# Patient Record
Sex: MALE | Race: OTHER | Employment: UNEMPLOYED | URBAN - METROPOLITAN AREA
[De-identification: names, ages, dates, MRNs, and addresses within clinical notes are randomized per-mention and may not be internally consistent; named-entity substitution may affect disease eponyms.]

---

## 2021-08-04 ENCOUNTER — APPOINTMENT (OUTPATIENT)
Dept: CT IMAGING | Age: 56
End: 2021-08-04
Payer: COMMERCIAL

## 2021-08-04 ENCOUNTER — HOSPITAL ENCOUNTER (EMERGENCY)
Age: 56
Discharge: HOME | End: 2021-08-04
Payer: COMMERCIAL

## 2021-08-04 VITALS
HEART RATE: 70 BPM | DIASTOLIC BLOOD PRESSURE: 87 MMHG | WEIGHT: 140 LBS | OXYGEN SATURATION: 96 % | RESPIRATION RATE: 18 BRPM | TEMPERATURE: 98 F | SYSTOLIC BLOOD PRESSURE: 129 MMHG
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
## 2021-08-04 PROCEDURE — 99284 EMERGENCY DEPT VISIT MOD MDM: CPT

## 2021-08-04 PROCEDURE — 71250 CT THORAX DX C-: CPT

## 2021-08-04 PROCEDURE — 70450 CT HEAD/BRAIN W/O DYE: CPT

## 2021-08-04 PROCEDURE — 6370000000 HC RX 637 (ALT 250 FOR IP): Performed by: PHYSICIAN ASSISTANT

## 2021-08-04 PROCEDURE — 72125 CT NECK SPINE W/O DYE: CPT

## 2021-08-04 RX ORDER — CYCLOBENZAPRINE HCL 10 MG
10 TABLET ORAL 3 TIMES DAILY PRN
Qty: 21 TABLET | Refills: 0 | Status: SHIPPED | OUTPATIENT
Start: 2021-08-04 | End: 2021-08-14

## 2021-08-04 RX ORDER — ACETAMINOPHEN 500 MG
1000 TABLET ORAL ONCE
Status: COMPLETED | OUTPATIENT
Start: 2021-08-04 | End: 2021-08-04

## 2021-08-04 RX ORDER — IBUPROFEN 600 MG/1
600 TABLET ORAL EVERY 6 HOURS PRN
Qty: 30 TABLET | Refills: 0 | Status: ON HOLD | OUTPATIENT
Start: 2021-08-04 | End: 2022-07-29 | Stop reason: HOSPADM

## 2021-08-04 RX ORDER — IBUPROFEN 600 MG/1
600 TABLET ORAL EVERY 6 HOURS PRN
Qty: 30 TABLET | Refills: 0 | Status: SHIPPED | OUTPATIENT
Start: 2021-08-04 | End: 2021-08-04 | Stop reason: SDUPTHER

## 2021-08-04 RX ORDER — CYCLOBENZAPRINE HCL 10 MG
10 TABLET ORAL 3 TIMES DAILY PRN
Qty: 21 TABLET | Refills: 0 | Status: SHIPPED | OUTPATIENT
Start: 2021-08-04 | End: 2021-08-04 | Stop reason: SDUPTHER

## 2021-08-04 RX ADMIN — ACETAMINOPHEN 1000 MG: 500 TABLET ORAL at 17:01

## 2021-08-04 ASSESSMENT — ENCOUNTER SYMPTOMS
SHORTNESS OF BREATH: 0
COUGH: 0
VOMITING: 0
NAUSEA: 0
ABDOMINAL PAIN: 0
DIARRHEA: 0
RHINORRHEA: 0

## 2021-08-04 ASSESSMENT — PAIN SCALES - GENERAL
PAINLEVEL_OUTOF10: 9
PAINLEVEL_OUTOF10: 9
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
## 2022-07-26 ENCOUNTER — APPOINTMENT (OUTPATIENT)
Dept: CT IMAGING | Age: 57
DRG: 282 | End: 2022-07-26
Payer: COMMERCIAL

## 2022-07-26 ENCOUNTER — APPOINTMENT (OUTPATIENT)
Dept: GENERAL RADIOLOGY | Age: 57
DRG: 282 | End: 2022-07-26
Payer: COMMERCIAL

## 2022-07-26 ENCOUNTER — HOSPITAL ENCOUNTER (INPATIENT)
Age: 57
LOS: 2 days | Discharge: HOME | DRG: 282 | End: 2022-07-29
Attending: HOSPITALIST | Admitting: HOSPITALIST
Payer: COMMERCIAL

## 2022-07-26 DIAGNOSIS — K85.90 ACUTE PANCREATITIS, UNSPECIFIED COMPLICATION STATUS, UNSPECIFIED PANCREATITIS TYPE: Primary | ICD-10-CM

## 2022-07-26 LAB
A/G RATIO: 1.5 (ref 1.1–2.2)
ALBUMIN SERPL-MCNC: 4.4 G/DL (ref 3.4–5)
ALP BLD-CCNC: 117 U/L (ref 40–129)
ALT SERPL-CCNC: 71 U/L (ref 10–40)
ANION GAP SERPL CALCULATED.3IONS-SCNC: 13 MMOL/L (ref 3–16)
AST SERPL-CCNC: 64 U/L (ref 15–37)
BACTERIA: NORMAL /HPF
BASOPHILS ABSOLUTE: 0 K/UL (ref 0–0.2)
BASOPHILS RELATIVE PERCENT: 0.5 %
BILIRUB SERPL-MCNC: 0.8 MG/DL (ref 0–1)
BILIRUBIN URINE: NEGATIVE
BLOOD, URINE: NEGATIVE
BUN BLDV-MCNC: 9 MG/DL (ref 7–20)
CALCIUM SERPL-MCNC: 9.4 MG/DL (ref 8.3–10.6)
CHLORIDE BLD-SCNC: 97 MMOL/L (ref 99–110)
CLARITY: CLEAR
CO2: 24 MMOL/L (ref 21–32)
COLOR: YELLOW
CREAT SERPL-MCNC: 0.7 MG/DL (ref 0.9–1.3)
EOSINOPHILS ABSOLUTE: 0.1 K/UL (ref 0–0.6)
EOSINOPHILS RELATIVE PERCENT: 0.6 %
EPITHELIAL CELLS, UA: 0 /HPF (ref 0–5)
GFR AFRICAN AMERICAN: >60
GFR NON-AFRICAN AMERICAN: >60
GLUCOSE BLD-MCNC: 180 MG/DL (ref 70–99)
GLUCOSE URINE: NEGATIVE MG/DL
HCT VFR BLD CALC: 42.4 % (ref 40.5–52.5)
HEMOGLOBIN: 15.1 G/DL (ref 13.5–17.5)
HYALINE CASTS: 0 /LPF (ref 0–8)
KETONES, URINE: NEGATIVE MG/DL
LEUKOCYTE ESTERASE, URINE: ABNORMAL
LIPASE: 158 U/L (ref 13–60)
LYMPHOCYTES ABSOLUTE: 1.5 K/UL (ref 1–5.1)
LYMPHOCYTES RELATIVE PERCENT: 17.6 %
MCH RBC QN AUTO: 33 PG (ref 26–34)
MCHC RBC AUTO-ENTMCNC: 35.5 G/DL (ref 31–36)
MCV RBC AUTO: 93.1 FL (ref 80–100)
MICROSCOPIC EXAMINATION: YES
MONOCYTES ABSOLUTE: 0.5 K/UL (ref 0–1.3)
MONOCYTES RELATIVE PERCENT: 6.5 %
NEUTROPHILS ABSOLUTE: 6.3 K/UL (ref 1.7–7.7)
NEUTROPHILS RELATIVE PERCENT: 74.8 %
NITRITE, URINE: NEGATIVE
PDW BLD-RTO: 13 % (ref 12.4–15.4)
PH UA: 5.5 (ref 5–8)
PLATELET # BLD: 119 K/UL (ref 135–450)
PMV BLD AUTO: 10 FL (ref 5–10.5)
POTASSIUM SERPL-SCNC: 4.8 MMOL/L (ref 3.5–5.1)
PROTEIN UA: 30 MG/DL
RBC # BLD: 4.56 M/UL (ref 4.2–5.9)
RBC UA: 3 /HPF (ref 0–4)
SODIUM BLD-SCNC: 134 MMOL/L (ref 136–145)
SPECIFIC GRAVITY UA: 1.02 (ref 1–1.03)
TOTAL PROTEIN: 7.3 G/DL (ref 6.4–8.2)
TROPONIN: <0.01 NG/ML
URINE REFLEX TO CULTURE: ABNORMAL
URINE TYPE: ABNORMAL
UROBILINOGEN, URINE: 0.2 E.U./DL
WBC # BLD: 8.4 K/UL (ref 4–11)
WBC UA: 3 /HPF (ref 0–5)

## 2022-07-26 PROCEDURE — 93005 ELECTROCARDIOGRAM TRACING: CPT | Performed by: PHYSICIAN ASSISTANT

## 2022-07-26 PROCEDURE — 81001 URINALYSIS AUTO W/SCOPE: CPT

## 2022-07-26 PROCEDURE — 71046 X-RAY EXAM CHEST 2 VIEWS: CPT

## 2022-07-26 PROCEDURE — 74177 CT ABD & PELVIS W/CONTRAST: CPT

## 2022-07-26 PROCEDURE — 96374 THER/PROPH/DIAG INJ IV PUSH: CPT

## 2022-07-26 PROCEDURE — 80053 COMPREHEN METABOLIC PANEL: CPT

## 2022-07-26 PROCEDURE — 6370000000 HC RX 637 (ALT 250 FOR IP): Performed by: PHYSICIAN ASSISTANT

## 2022-07-26 PROCEDURE — 6360000004 HC RX CONTRAST MEDICATION: Performed by: PHYSICIAN ASSISTANT

## 2022-07-26 PROCEDURE — 99285 EMERGENCY DEPT VISIT HI MDM: CPT

## 2022-07-26 PROCEDURE — 96375 TX/PRO/DX INJ NEW DRUG ADDON: CPT

## 2022-07-26 PROCEDURE — 85025 COMPLETE CBC W/AUTO DIFF WBC: CPT

## 2022-07-26 PROCEDURE — 83690 ASSAY OF LIPASE: CPT

## 2022-07-26 PROCEDURE — 84484 ASSAY OF TROPONIN QUANT: CPT

## 2022-07-26 RX ORDER — SODIUM CHLORIDE, SODIUM LACTATE, POTASSIUM CHLORIDE, CALCIUM CHLORIDE 600; 310; 30; 20 MG/100ML; MG/100ML; MG/100ML; MG/100ML
1000 INJECTION, SOLUTION INTRAVENOUS ONCE
Status: COMPLETED | OUTPATIENT
Start: 2022-07-27 | End: 2022-07-27

## 2022-07-26 RX ORDER — ONDANSETRON 2 MG/ML
4 INJECTION INTRAMUSCULAR; INTRAVENOUS EVERY 6 HOURS PRN
Status: DISCONTINUED | OUTPATIENT
Start: 2022-07-26 | End: 2022-07-27 | Stop reason: SDUPTHER

## 2022-07-26 RX ORDER — FAMOTIDINE 20 MG/1
20 TABLET, FILM COATED ORAL ONCE
Status: COMPLETED | OUTPATIENT
Start: 2022-07-26 | End: 2022-07-26

## 2022-07-26 RX ORDER — MORPHINE SULFATE 4 MG/ML
4 INJECTION, SOLUTION INTRAMUSCULAR; INTRAVENOUS EVERY 30 MIN PRN
Status: DISCONTINUED | OUTPATIENT
Start: 2022-07-26 | End: 2022-07-27 | Stop reason: HOSPADM

## 2022-07-26 RX ORDER — HYDROCODONE BITARTRATE AND ACETAMINOPHEN 5; 325 MG/1; MG/1
2 TABLET ORAL ONCE
Status: COMPLETED | OUTPATIENT
Start: 2022-07-26 | End: 2022-07-26

## 2022-07-26 RX ORDER — ONDANSETRON 4 MG/1
4 TABLET, ORALLY DISINTEGRATING ORAL ONCE
Status: COMPLETED | OUTPATIENT
Start: 2022-07-26 | End: 2022-07-26

## 2022-07-26 RX ADMIN — FAMOTIDINE 20 MG: 20 TABLET, FILM COATED ORAL at 20:18

## 2022-07-26 RX ADMIN — IOPAMIDOL 75 ML: 755 INJECTION, SOLUTION INTRAVENOUS at 21:30

## 2022-07-26 RX ADMIN — ALUMINUM HYDROXIDE, MAGNESIUM HYDROXIDE, AND SIMETHICONE: 200; 200; 20 SUSPENSION ORAL at 20:18

## 2022-07-26 RX ADMIN — HYDROCODONE BITARTRATE AND ACETAMINOPHEN 2 TABLET: 5; 325 TABLET ORAL at 20:18

## 2022-07-26 RX ADMIN — ONDANSETRON 4 MG: 4 TABLET, ORALLY DISINTEGRATING ORAL at 20:18

## 2022-07-26 ASSESSMENT — ENCOUNTER SYMPTOMS
SHORTNESS OF BREATH: 0
RHINORRHEA: 0
COUGH: 0
DIARRHEA: 0
NAUSEA: 1
ABDOMINAL PAIN: 0
ABDOMINAL DISTENTION: 1
VOMITING: 1

## 2022-07-26 ASSESSMENT — PAIN SCALES - GENERAL: PAINLEVEL_OUTOF10: 9

## 2022-07-26 ASSESSMENT — PAIN - FUNCTIONAL ASSESSMENT: PAIN_FUNCTIONAL_ASSESSMENT: 0-10

## 2022-07-26 ASSESSMENT — PAIN DESCRIPTION - LOCATION: LOCATION: CHEST

## 2022-07-27 ENCOUNTER — APPOINTMENT (OUTPATIENT)
Dept: ULTRASOUND IMAGING | Age: 57
DRG: 282 | End: 2022-07-27
Payer: COMMERCIAL

## 2022-07-27 PROBLEM — K85.00 IDIOPATHIC ACUTE PANCREATITIS: Status: ACTIVE | Noted: 2022-07-27

## 2022-07-27 PROBLEM — K85.90 ACUTE PANCREATITIS: Status: ACTIVE | Noted: 2022-07-27

## 2022-07-27 LAB
ESTIMATED AVERAGE GLUCOSE: 185.8 MG/DL
GLUCOSE BLD-MCNC: 126 MG/DL (ref 70–99)
GLUCOSE BLD-MCNC: 129 MG/DL (ref 70–99)
GLUCOSE BLD-MCNC: 141 MG/DL (ref 70–99)
GLUCOSE BLD-MCNC: 157 MG/DL (ref 70–99)
GLUCOSE BLD-MCNC: 165 MG/DL (ref 70–99)
GLUCOSE BLD-MCNC: 166 MG/DL (ref 70–99)
HBA1C MFR BLD: 8.1 %
PERFORMED ON: ABNORMAL
TRIGL SERPL-MCNC: 2026 MG/DL (ref 0–150)

## 2022-07-27 PROCEDURE — 84478 ASSAY OF TRIGLYCERIDES: CPT

## 2022-07-27 PROCEDURE — 6360000002 HC RX W HCPCS: Performed by: PHYSICIAN ASSISTANT

## 2022-07-27 PROCEDURE — 2580000003 HC RX 258: Performed by: PHYSICIAN ASSISTANT

## 2022-07-27 PROCEDURE — 76705 ECHO EXAM OF ABDOMEN: CPT

## 2022-07-27 PROCEDURE — 2580000003 HC RX 258: Performed by: HOSPITALIST

## 2022-07-27 PROCEDURE — 83036 HEMOGLOBIN GLYCOSYLATED A1C: CPT

## 2022-07-27 PROCEDURE — 1200000000 HC SEMI PRIVATE

## 2022-07-27 PROCEDURE — 6370000000 HC RX 637 (ALT 250 FOR IP): Performed by: INTERNAL MEDICINE

## 2022-07-27 PROCEDURE — 6360000002 HC RX W HCPCS: Performed by: HOSPITALIST

## 2022-07-27 PROCEDURE — 36415 COLL VENOUS BLD VENIPUNCTURE: CPT

## 2022-07-27 RX ORDER — SODIUM CHLORIDE 9 MG/ML
INJECTION, SOLUTION INTRAVENOUS PRN
Status: DISCONTINUED | OUTPATIENT
Start: 2022-07-27 | End: 2022-07-29 | Stop reason: HOSPADM

## 2022-07-27 RX ORDER — MAGNESIUM SULFATE IN WATER 40 MG/ML
2000 INJECTION, SOLUTION INTRAVENOUS PRN
Status: DISCONTINUED | OUTPATIENT
Start: 2022-07-27 | End: 2022-07-29 | Stop reason: HOSPADM

## 2022-07-27 RX ORDER — ONDANSETRON 2 MG/ML
4 INJECTION INTRAMUSCULAR; INTRAVENOUS EVERY 6 HOURS PRN
Status: DISCONTINUED | OUTPATIENT
Start: 2022-07-27 | End: 2022-07-29 | Stop reason: HOSPADM

## 2022-07-27 RX ORDER — MORPHINE SULFATE 4 MG/ML
4 INJECTION, SOLUTION INTRAMUSCULAR; INTRAVENOUS
Status: DISCONTINUED | OUTPATIENT
Start: 2022-07-27 | End: 2022-07-29 | Stop reason: HOSPADM

## 2022-07-27 RX ORDER — POTASSIUM CHLORIDE 7.45 MG/ML
10 INJECTION INTRAVENOUS PRN
Status: DISCONTINUED | OUTPATIENT
Start: 2022-07-27 | End: 2022-07-29 | Stop reason: HOSPADM

## 2022-07-27 RX ORDER — MORPHINE SULFATE 2 MG/ML
2 INJECTION, SOLUTION INTRAMUSCULAR; INTRAVENOUS
Status: DISCONTINUED | OUTPATIENT
Start: 2022-07-27 | End: 2022-07-29 | Stop reason: HOSPADM

## 2022-07-27 RX ORDER — ENOXAPARIN SODIUM 100 MG/ML
40 INJECTION SUBCUTANEOUS NIGHTLY
Status: DISCONTINUED | OUTPATIENT
Start: 2022-07-27 | End: 2022-07-29 | Stop reason: HOSPADM

## 2022-07-27 RX ORDER — SODIUM CHLORIDE 0.9 % (FLUSH) 0.9 %
5-40 SYRINGE (ML) INJECTION PRN
Status: DISCONTINUED | OUTPATIENT
Start: 2022-07-27 | End: 2022-07-29 | Stop reason: HOSPADM

## 2022-07-27 RX ORDER — SODIUM CHLORIDE 0.9 % (FLUSH) 0.9 %
5-40 SYRINGE (ML) INJECTION EVERY 12 HOURS SCHEDULED
Status: DISCONTINUED | OUTPATIENT
Start: 2022-07-27 | End: 2022-07-29 | Stop reason: HOSPADM

## 2022-07-27 RX ORDER — SODIUM CHLORIDE, SODIUM LACTATE, POTASSIUM CHLORIDE, CALCIUM CHLORIDE 600; 310; 30; 20 MG/100ML; MG/100ML; MG/100ML; MG/100ML
INJECTION, SOLUTION INTRAVENOUS CONTINUOUS
Status: DISCONTINUED | OUTPATIENT
Start: 2022-07-27 | End: 2022-07-27

## 2022-07-27 RX ORDER — INSULIN LISPRO 100 [IU]/ML
0-4 INJECTION, SOLUTION INTRAVENOUS; SUBCUTANEOUS EVERY 4 HOURS
Status: DISCONTINUED | OUTPATIENT
Start: 2022-07-27 | End: 2022-07-29 | Stop reason: HOSPADM

## 2022-07-27 RX ORDER — POTASSIUM CHLORIDE 20 MEQ/1
40 TABLET, EXTENDED RELEASE ORAL PRN
Status: DISCONTINUED | OUTPATIENT
Start: 2022-07-27 | End: 2022-07-29 | Stop reason: HOSPADM

## 2022-07-27 RX ORDER — DEXTROSE MONOHYDRATE 100 MG/ML
INJECTION, SOLUTION INTRAVENOUS CONTINUOUS PRN
Status: DISCONTINUED | OUTPATIENT
Start: 2022-07-27 | End: 2022-07-29 | Stop reason: HOSPADM

## 2022-07-27 RX ORDER — ACETAMINOPHEN 325 MG/1
650 TABLET ORAL EVERY 6 HOURS PRN
Status: DISCONTINUED | OUTPATIENT
Start: 2022-07-27 | End: 2022-07-29 | Stop reason: HOSPADM

## 2022-07-27 RX ORDER — ONDANSETRON 4 MG/1
4 TABLET, ORALLY DISINTEGRATING ORAL EVERY 8 HOURS PRN
Status: DISCONTINUED | OUTPATIENT
Start: 2022-07-27 | End: 2022-07-29 | Stop reason: HOSPADM

## 2022-07-27 RX ORDER — SODIUM CHLORIDE, SODIUM LACTATE, POTASSIUM CHLORIDE, CALCIUM CHLORIDE 600; 310; 30; 20 MG/100ML; MG/100ML; MG/100ML; MG/100ML
INJECTION, SOLUTION INTRAVENOUS CONTINUOUS
Status: DISCONTINUED | OUTPATIENT
Start: 2022-07-27 | End: 2022-07-29

## 2022-07-27 RX ORDER — KETOROLAC TROMETHAMINE 30 MG/ML
30 INJECTION, SOLUTION INTRAMUSCULAR; INTRAVENOUS EVERY 6 HOURS PRN
Status: DISCONTINUED | OUTPATIENT
Start: 2022-07-27 | End: 2022-07-29 | Stop reason: HOSPADM

## 2022-07-27 RX ADMIN — KETOROLAC TROMETHAMINE 30 MG: 30 INJECTION, SOLUTION INTRAMUSCULAR at 21:30

## 2022-07-27 RX ADMIN — ONDANSETRON 4 MG: 2 INJECTION INTRAMUSCULAR; INTRAVENOUS at 00:03

## 2022-07-27 RX ADMIN — SODIUM CHLORIDE, POTASSIUM CHLORIDE, SODIUM LACTATE AND CALCIUM CHLORIDE: 600; 310; 30; 20 INJECTION, SOLUTION INTRAVENOUS at 13:01

## 2022-07-27 RX ADMIN — SODIUM CHLORIDE, POTASSIUM CHLORIDE, SODIUM LACTATE AND CALCIUM CHLORIDE 1000 ML: 600; 310; 30; 20 INJECTION, SOLUTION INTRAVENOUS at 00:02

## 2022-07-27 RX ADMIN — MORPHINE SULFATE 2 MG: 2 INJECTION, SOLUTION INTRAMUSCULAR; INTRAVENOUS at 10:20

## 2022-07-27 RX ADMIN — MORPHINE SULFATE 4 MG: 4 INJECTION, SOLUTION INTRAMUSCULAR; INTRAVENOUS at 00:03

## 2022-07-27 RX ADMIN — SODIUM CHLORIDE, POTASSIUM CHLORIDE, SODIUM LACTATE AND CALCIUM CHLORIDE: 600; 310; 30; 20 INJECTION, SOLUTION INTRAVENOUS at 01:52

## 2022-07-27 RX ADMIN — ACETAMINOPHEN 650 MG: 325 TABLET ORAL at 12:58

## 2022-07-27 RX ADMIN — SODIUM CHLORIDE, POTASSIUM CHLORIDE, SODIUM LACTATE AND CALCIUM CHLORIDE: 600; 310; 30; 20 INJECTION, SOLUTION INTRAVENOUS at 15:34

## 2022-07-27 RX ADMIN — MORPHINE SULFATE 4 MG: 4 INJECTION, SOLUTION INTRAMUSCULAR; INTRAVENOUS at 01:58

## 2022-07-27 RX ADMIN — SODIUM CHLORIDE, POTASSIUM CHLORIDE, SODIUM LACTATE AND CALCIUM CHLORIDE: 600; 310; 30; 20 INJECTION, SOLUTION INTRAVENOUS at 10:20

## 2022-07-27 RX ADMIN — ENOXAPARIN SODIUM 40 MG: 100 INJECTION SUBCUTANEOUS at 21:30

## 2022-07-27 ASSESSMENT — PAIN DESCRIPTION - DESCRIPTORS
DESCRIPTORS: ACHING

## 2022-07-27 ASSESSMENT — PAIN DESCRIPTION - LOCATION
LOCATION: ABDOMEN
LOCATION: ABDOMEN
LOCATION: HEAD

## 2022-07-27 ASSESSMENT — PAIN DESCRIPTION - ORIENTATION
ORIENTATION: MID
ORIENTATION: RIGHT
ORIENTATION: POSTERIOR
ORIENTATION: MID

## 2022-07-27 ASSESSMENT — PAIN SCALES - GENERAL
PAINLEVEL_OUTOF10: 9
PAINLEVEL_OUTOF10: 7
PAINLEVEL_OUTOF10: 9
PAINLEVEL_OUTOF10: 7

## 2022-07-27 ASSESSMENT — PAIN - FUNCTIONAL ASSESSMENT
PAIN_FUNCTIONAL_ASSESSMENT: ACTIVITIES ARE NOT PREVENTED
PAIN_FUNCTIONAL_ASSESSMENT: ACTIVITIES ARE NOT PREVENTED

## 2022-07-27 ASSESSMENT — PAIN DESCRIPTION - PAIN TYPE: TYPE: ACUTE PAIN

## 2022-07-27 NOTE — ED NOTES
Report given to [facility], RN, patient left this ER in stable condition at this time.       Juan Perera RN  07/27/22 0127

## 2022-07-27 NOTE — PROGRESS NOTES
Patient admitted after midnight with epigastric pain. No new medications. Smokes 3 to 4 cigarettes/day. Moderate alcohol use. No nausea or vomiting at present time. He complains of a headache. Will order right upper quadrant ultrasound. Triglycerides are pending. Continue morphine. And IV fluids add Toradol.     Sharon Joseph PA-c

## 2022-07-27 NOTE — PLAN OF CARE
Problem: Discharge Planning  Goal: Discharge to home or other facility with appropriate resources  7/27/2022 1143 by Eran Rhodes RN  Outcome: Progressing Towards Goal  Flowsheets (Taken 7/27/2022 0531 by Nesha Orr RN)  Discharge to home or other facility with appropriate resources:   Identify barriers to discharge with patient and caregiver   Refer to discharge planning if patient needs post-hospital services based on physician order or complex needs related to functional status, cognitive ability or social support system  7/27/2022 0528 by Nesha Orr RN  Outcome: Progressing Towards Goal  Flowsheets (Taken 7/27/2022 0205)  Discharge to home or other facility with appropriate resources: Identify barriers to discharge with patient and caregiver     Problem: Pain  Goal: Verbalizes/displays adequate comfort level or baseline comfort level  7/27/2022 1143 by Eran Rhodes RN  Outcome: Progressing Towards Goal  7/27/2022 0528 by Nesha Orr RN  Outcome: Progressing Towards Goal     Problem: Gastrointestinal - Adult  Goal: Minimal or absence of nausea and vomiting  7/27/2022 1143 by Eran Rhodes RN  Outcome: Progressing Towards Goal  7/27/2022 0528 by Nesha Orr RN  Outcome: Progressing Towards Goal  Flowsheets (Taken 7/27/2022 0205)  Minimal or absence of nausea and vomiting:   Administer IV fluids as ordered to ensure adequate hydration   Maintain NPO status until nausea and vomiting are resolved   Provide nonpharmacologic comfort measures as appropriate  Goal: Maintains or returns to baseline bowel function  7/27/2022 1143 by Eran Rhodes RN  Outcome: Progressing Towards Goal  7/27/2022 0528 by Nesha Orr RN  Outcome: Progressing Towards Goal  Flowsheets (Taken 7/27/2022 0205)  Maintains or returns to baseline bowel function:   Assess bowel function   Administer IV fluids as ordered to ensure adequate hydration  Goal: Maintains adequate nutritional intake  7/27/2022 1143 by Eran Rhodes RN  Outcome: Progressing Towards Goal  7/27/2022 0528 by Chas Muhammad RN  Outcome: Progressing Towards Goal  Flowsheets (Taken 7/27/2022 0205)  Maintains adequate nutritional intake: Monitor percentage of each meal consumed     Problem: Metabolic/Fluid and Electrolytes - Adult  Goal: Electrolytes maintained within normal limits  7/27/2022 1143 by Irasema Garay RN  Outcome: Progressing Towards Goal  7/27/2022 0528 by Chas Muhammad RN  Outcome: Progressing Towards Goal  Flowsheets (Taken 7/27/2022 0205)  Electrolytes maintained within normal limits: Monitor labs and assess patient for signs and symptoms of electrolyte imbalances

## 2022-07-27 NOTE — PROGRESS NOTES
Pt assessment completed, VSS, A&Ox4, make needs known, C/O pain 9/10 to abd and headache, med administered per order, remains NPO at this time, POC and education reviewed with pt and mutually agreed upon, all needs met at this time, will continue to monitor.

## 2022-07-27 NOTE — PLAN OF CARE
Problem: Discharge Planning  Goal: Discharge to home or other facility with appropriate resources  Outcome: Progressing Towards Goal     Problem: Pain  Goal: Verbalizes/displays adequate comfort level or baseline comfort level  Outcome: Progressing Towards Goal     Problem: Gastrointestinal - Adult  Goal: Minimal or absence of nausea and vomiting  Outcome: Progressing Towards Goal  Goal: Maintains or returns to baseline bowel function  Outcome: Progressing Towards Goal  Goal: Maintains adequate nutritional intake  Outcome: Progressing Towards Goal     Problem: Metabolic/Fluid and Electrolytes - Adult  Goal: Electrolytes maintained within normal limits  Outcome: Progressing Towards Goal

## 2022-07-27 NOTE — ED PROVIDER NOTES
[facility]        Pt Name: Dhiraj Rivera  MRN: [id number]  [place] 1965  Date of evaluation: 7/26/2022  Provider: Vinayak Watkins PA-C  PCP: No primary care provider on file. Note Started: 8:27 PM EDT       THOMAS. I have evaluated this patient. My supervising physician was available for consultation. CHIEF COMPLAINT       Chief Complaint   Patient presents with    Chest Pain     Chest pain, rib pain, n/v       HISTORY OF PRESENT ILLNESS   (Location, Timing/Onset, Context/Setting, Quality, Duration, Modifying Factors, Severity, Associated Signs and Symptoms)  Note limiting factors. The patient's preferred language is Armenian, language lines are used for interpretation    Chief Complaint: Abdominal pain    Harpreet Brower is a 56 y.o. male who presents to the emergency department today for evaluation for abdominal pain. The patient states that he started with a \"burning\" epigastric abdominal pain and right upper quadrant abdominal pain since 11 PM last night. The patient states he attempted to drink warm water without much improvement. He states that he has had this pain in the past and thought that this was due to reflux. Patient states that his pain continued throughout the night, and into today. He is nauseous and did have 1 episode of emesis. There is no bilious emesis, hematemesis or coffee-ground emesis. Patient states that he has pain to his epigastric abdomen and right upper quadrant only, denies any pain radiating up into the chest.  He has no shortness of breath. He has not had any fever chills per no cough or congestion. No diarrhea. He has no dysuria or hematuria. He has a history of diabetes and coronary artery disease. No past surgical history to the abdomen.   Patient otherwise has no other complaints at this time    Nursing Notes were all reviewed and agreed with or any disagreements were addressed in the HPI. REVIEW OF SYSTEMS    (2-9 systems for level 4, 10 or more for level 5)     Review of Systems   Constitutional:  Negative for activity change, appetite change, chills and fever. HENT:  Negative for congestion and rhinorrhea. Respiratory:  Negative for cough and shortness of breath. Cardiovascular:  Negative for chest pain. Gastrointestinal:  Positive for abdominal distention, nausea and vomiting. Negative for abdominal pain and diarrhea. Genitourinary:  Negative for difficulty urinating, dysuria and hematuria. Positives and Pertinent negatives as per HPI. Except as noted above in the ROS, all other systems were reviewed and negative. PAST MEDICAL HISTORY     Past Medical History:   Diagnosis Date    CAD (coronary artery disease)     Diabetes mellitus ([facility] [address]          SURGICAL HISTORY   History reviewed. No pertinent surgical history. CURRENTMEDICATIONS       Previous Medications    IBUPROFEN (ADVIL;MOTRIN) 600 MG TABLET    Take 1 tablet by mouth every 6 hours as needed for Pain         ALLERGIES     Patient has no known allergies. FAMILYHISTORY     History reviewed. No pertinent family history. SOCIAL HISTORY       Social History     Tobacco Use    Smoking status: Never   Substance Use Topics    Alcohol use: Not Currently    Drug use: Not Currently       SCREENINGS             PHYSICAL EXAM    (up to 7 for level 4, 8 or more for level 5)     ED Triage Vitals   BP Temp Temp Source Heart Rate Resp SpO2 Height Weight   07/26/22 2004 07/26/22 2000 07/26/22 2000 07/26/22 2004 07/26/22 2004 07/26/22 2004 -- 07/26/22 2004   (!) 133/92 98.7 °F (37.1 °C) Oral 92 18 94 %  138 lb (62.6 kg)       Physical Exam  Vitals and nursing note reviewed. Constitutional:       Appearance: He is well-developed. He is not diaphoretic. HENT:      Head: Normocephalic and atraumatic.       Right Ear: External ear normal.      Left Ear: External ear normal.      Nose: Nose normal. Eyes:      General:         Right eye: No discharge. Left eye: No discharge. Neck:      Trachea: No tracheal deviation. Cardiovascular:      Rate and Rhythm: Normal rate and regular rhythm. Pulmonary:      Effort: Pulmonary effort is normal. No respiratory distress. Breath sounds: Normal breath sounds. No wheezing or rales. Abdominal:      General: Bowel sounds are normal. There is no distension. Palpations: Abdomen is soft. Tenderness: There is abdominal tenderness in the right upper quadrant and epigastric area. There is no guarding or rebound. Musculoskeletal:         General: Normal range of motion. Cervical back: Normal range of motion and neck supple. Skin:     General: Skin is warm and dry. Neurological:      Mental Status: He is alert and oriented to person, place, and time. Psychiatric:         Behavior: Behavior normal.       DIAGNOSTIC RESULTS   LABS:    Labs Reviewed   CBC WITH AUTO DIFFERENTIAL - Abnormal; Notable for the following components:       Result Value    Platelets 416 (*)     All other components within normal limits   COMPREHENSIVE METABOLIC PANEL - Abnormal; Notable for the following components:    Sodium 134 (*)     Chloride 97 (*)     Glucose 180 (*)     Creatinine 0.7 (*)     ALT 71 (*)     AST 64 (*)     All other components within normal limits   LIPASE - Abnormal; Notable for the following components:    Lipase 158.0 (*)     All other components within normal limits   URINALYSIS WITH REFLEX TO CULTURE - Abnormal; Notable for the following components:    Protein, UA 30 (*)     Leukocyte Esterase, Urine TRACE (*)     All other components within normal limits   TROPONIN   MICROSCOPIC URINALYSIS       When ordered only abnormal lab results are displayed. All other labs were within normal range or not returned as of this dictation. EKG:  When ordered, EKG's are interpreted by the Emergency Department Physician in the absence of a cardiologist.  Please see their note for interpretation of EKG. RADIOLOGY:   Non-plain film images such as CT, Ultrasound and MRI are read by the radiologist. Plain radiographic images are visualized and preliminarily interpreted by the ED Provider with the below findings:        Interpretation per the Radiologist below, if available at the time of this note:    CT ABDOMEN PELVIS W IV CONTRAST Additional Contrast? None   Final Result   1. Findings compatible with mild acute pancreatitis involving the head. No   identifiable gallstones or evidence for biliary dilatation. 2.  Marked hepatic steatosis. 3.  Left nephrolithiasis. XR CHEST (2 VW)   Final Result   No acute process. No results found.         PROCEDURES   Unless otherwise noted below, none     Procedures    CRITICAL CARE TIME       CONSULTS:  None      EMERGENCY DEPARTMENT COURSE and DIFFERENTIAL DIAGNOSIS/MDM:   Vitals:    Vitals:    07/26/22 2000 07/26/22 2004   BP:  (!) 133/92   Pulse:  92   Resp:  18   Temp: 98.7 °F (37.1 °C)    TempSrc: Oral    SpO2:  94%   Weight:  138 lb (62.6 kg)       Patient was given the following medications:  Medications   morphine injection 4 mg (4 mg IntraVENous Given 7/27/22 0003)   ondansetron (ZOFRAN) injection 4 mg (4 mg IntraVENous Given 7/27/22 0003)   aluminum & magnesium hydroxide-simethicone (MAALOX) 30 mL, lidocaine viscous hcl (XYLOCAINE) 5 mL (GI COCKTAIL) ( Oral Given 7/26/22 2018)   HYDROcodone-acetaminophen (NORCO) 5-325 MG per tablet 2 tablet (2 tablets Oral Given 7/26/22 2018)   ondansetron (ZOFRAN-ODT) disintegrating tablet 4 mg (4 mg Oral Given 7/26/22 2018)   famotidine (PEPCID) tablet 20 mg (20 mg Oral Given 7/26/22 2018)   iopamidol (ISOVUE-370) 76 % injection 75 mL (75 mLs IntraVENous Given 7/26/22 2130)   lactated ringers infusion 1,000 mL (1,000 mLs IntraVENous New Bag 7/27/22 0002)         Is this patient to be included in the SEP-1 Core Measure due to severe sepsis or septic shock? No   Exclusion criteria - the patient is NOT to be included for SEP-1 Core Measure due to:  2+ SIRS criteria are not met    Briefly, this is a 16-year-old male who presents to the emergency department today for evaluation for right upper quadrant epigastric abdominal pain. Symptoms began at 11 PM last night. Have been constant. Associated nausea and vomiting. Has no chest pain    Physical exam he does have tenderness to his epigastric abdomen and right upper quadrant. Although I feel that clinically symptoms are likely due to an abdominal etiology, due to his history of coronary artery disease and diabetes will obtain EKG, troponin    EKG seconded by my attending, please see his note for further details    CBC shows no evidence of leukocytosis or anemia. CMP does show elevated AST and ALT however this is due to specimen hemolyzed this. Lipase is elevated at 158. Urine shows no evidence of infection or blood, troponin is negative. CT obtained which shows acute pancreatitis there is no gallstones or biliary dilatation    Due to the findings of acute pancreatitis, clinical presentation I feel that he will need to be admitted for further care and evaluation, fluids, pain medications given, patient is updated, agreeable with plan, and stable for admission  FINAL IMPRESSION      1. Acute pancreatitis, unspecified complication status, unspecified pancreatitis type          DISPOSITION/PLAN   DISPOSITION Decision To Admit 07/26/2022 11:55:21 PM      PATIENT REFERRED TO:  No follow-up provider specified.     DISCHARGE MEDICATIONS:  New Prescriptions    No medications on file       DISCONTINUED MEDICATIONS:  Discontinued Medications    No medications on file              (Please note that portions of this note were completed with a voice recognition program.  Efforts were made to edit the dictations but occasionally words are mis-transcribed.)    Vinayak Watkins PA-C (electronically signed) Vlad Medel PA-C  07/27/22 0013

## 2022-07-27 NOTE — CONSULTS
Gastroenterology Consult Note        Patient: Tama Severs  : 1965  Acct#:      Date:  2022    Subjective:       History of Present Illness  Patient is a 56 y.o.  male admitted with Idiopathic acute pancreatitis [K85.00]  Acute pancreatitis, unspecified complication status, unspecified pancreatitis type [K85.90] who is seen in consult for pancreatitis. History obtained via Daphne . Pt had sudden onset of a burning pain in the RUQ with radiation to the back  with associated N/V which started 2 nights ago. Pain was persistent so came to the ED. Dx with acute pancreatitis. No h/o pancreatitis. Drinks alcohol occasionally described as a few glasses of alcohol once every 1-2 weeks. Less abdominal pain today. Past Medical History:   Diagnosis Date    CAD (coronary artery disease)     Diabetes mellitus ([facility] [address]       History reviewed. No pertinent surgical history. Past Endoscopic History    Admission Meds  No current facility-administered medications on file prior to encounter. Current Outpatient Medications on File Prior to Encounter   Medication Sig Dispense Refill    ibuprofen (ADVIL;MOTRIN) 600 MG tablet Take 1 tablet by mouth every 6 hours as needed for Pain (Patient not taking: Reported on 2022) 30 tablet 0       Omeprazole, metformin, rosuvastatin , and fish oil. No new meds. Allergies  No Known Allergies   Social   Social History     Tobacco Use    Smoking status: Never    Smokeless tobacco: Not on file   Substance Use Topics    Alcohol use: Not Currently        History reviewed. No pertinent family history.        Review of Systems  Constitutional: negative for fevers, chills, sweats    Ears, nose, mouth, throat, and face: negative for nasal congestion and sore throat   Respiratory: negative for cough and shortness of breath   Cardiovascular: negative for chest pain and dyspnea   Gastrointestinal: see hpi   Genitourinary:negative for dysuria and frequency   Integument/breast: negative for pruritus and rash   Hematologic/lymphatic: negative for bleeding and easy bruising   Musculoskeletal:negative for arthralgias and myalgias   Neurological: negative for dizziness and weakness       Physical Exam  Blood pressure 131/78, pulse 87, temperature 98 °F (36.7 °C), temperature source Oral, resp. rate 16, height 5' 7\" (1.702 m), weight 141 lb 6.4 oz (64.1 kg), SpO2 94 %. General appearance: alert, cooperative, no distress, appears stated age  Eyes: Anicteric  Head: Normocephalic, without obvious abnormality  Lungs: clear to auscultation bilaterally, Normal Effort  Heart: regular rate and rhythm, normal S1 and S2, no murmurs or rubs  Abdomen: soft, non-tender. Bowel sounds normal. No masses,  no organomegaly. Extremities: atraumatic, no cyanosis or edema  Skin: warm and dry, no jaundice  Neuro: Grossly intact, A&OX3  Musculoskeletal: 5/5  strength BUE      Data Review:    Recent Labs     07/26/22 2023   WBC 8.4   HGB 15.1   HCT 42.4   MCV 93.1   *     Recent Labs     07/26/22 2023   *   K 4.8   CL 97*   CO2 24   BUN 9   CREATININE 0.7*     Recent Labs     07/26/22 2023   AST 64*   ALT 71*   BILITOT 0.8   ALKPHOS 117     Recent Labs     07/26/22 2023   LIPASE 158.0*     No results for input(s): PROTIME, INR in the last 72 hours. No results for input(s): PTT in the last 72 hours. No results for input(s): OCCULTBLD in the last 72 hours. Imaging Studies:               CT-scan of abdomen and pelvis w iv contrast 7/26/22  Impression   1. Findings compatible with mild acute pancreatitis involving the head. No   identifiable gallstones or evidence for biliary dilatation. 2.  Marked hepatic steatosis. 3.  Left nephrolithiasis. Assessment:     Principal Problem:    Idiopathic acute pancreatitis  Active Problems:    Acute pancreatitis  Resolved Problems:    * No resolved hospital problems.  *    Acute pancreatitis - RUQ pain along with mild elevation of transaminses, lipase 158, and CT evidence of mild acute pancreatitis. Right upper quadrant ultrasound pending. Alcohol use is not often enough to cause pancreatitis. Triglyceride level pending. No new meds. Calcium normal.      Recommendations:   - NPO  - IVF with LR at 250 ml/hr  - I&O  - RUQ US  - f/u TG level    Discussed with Dr. Erik Ramey, PA-C  [facility]  I have personally performed a face to face diagnostic evaluation on this patient. I have interviewed and examined the patient and I agree with the findings and recommended plan of care. In summary, my findings and plan are the following: As above, 57 yo Daphne man with first episode of acute pancreatitis. Moderate alcohol and tobacco history could be cause, but need to r/o GB disease and metabolic causes. He appears comfortable and abd is soft and NT on my exam.  Agree with plan as outlined above. Further rec's based on US and blood results. Discussed with patient via .     Julianne Brewer, [address] [place] [address]  7/27/2022

## 2022-07-27 NOTE — H&P
_    [facility] HOSPITALIST HISTORY AND PHYSICAL/CONSULT    7/27/2022 12:17 AM    Patient Information:  Johnny Billingsley is a 56 y.o. male [id number]    PCP:  No primary care provider on file. (Tel: None )    Date of Service:   Pt seen/examined on 7/27/2022   Admitted to Inpatient with expected LOS greater than two midnights due to medical therapy. Chief complaint:    Chief Complaint   Patient presents with    Chest Pain     Chest pain, rib pain, n/v       History of Present Illness:  Tre Woodward is a 56 y.o. male who presented with   Comes from home w. Upper Abd pain and RUQ pain  since last 1 day or so , since 11 PM last night . Also he reports 2-3 vomiting episodes at home This AM . He has been doing poorly w/ PO intake AT home since his s/s started last night   He is Daphne speaking . History and pertinent information obtained from   ED provider   Prior Chart    Patient        Vitals:    07/26/22 2000 07/26/22 2004   BP:  (!) 133/92   Pulse:  92   Resp:  18   Temp: 98.7 °F (37.1 °C)    TempSrc: Oral    SpO2:  94%   Weight:  138 lb (62.6 kg)          Imaging/Labs/Work up/Medications given/Consults called by ED => Reviewed and Noted    GI Cocktail X 1   Pepcid X 1   Norco X 1   LR X 1   Zofran X 1       Current Facility-Administered Medications: morphine injection 4 mg, 4 mg, IntraVENous, Q30 Min PRN  ondansetron (ZOFRAN) injection 4 mg, 4 mg, IntraVENous, Q6H PRN      On my evaluation, patient's condition as below :   Since arrival to ED, post above Rx, patient is AO X 3   Pain is better since arrival now   No active N/V noted in ED     POC d/w Him       REVIEW OF SYSTEMS:     Pertinent positives and negatives are noted in the HPI . All other systems were reviewed and are negative. Past Medical History:         has a past medical history of CAD (coronary artery disease) and Diabetes mellitus ([facility] [address] Past Surgical History:         has no past surgical history on file. Medications:          No current facility-administered medications on file prior to encounter. Current Outpatient Medications on File Prior to Encounter   Medication Sig Dispense Refill    ibuprofen (ADVIL;MOTRIN) 600 MG tablet Take 1 tablet by mouth every 6 hours as needed for Pain (Patient not taking: Reported on 7/26/2022) 30 tablet 0         Allergies:  No Known Allergies       Social History:   reports that he does not currently use alcohol. He reports that he does not currently use drugs. Family History:        History reviewed. No pertinent family history. Physical Exam:  BP (!) 133/92   Pulse 92   Temp 98.7 °F (37.1 °C) (Oral)   Resp 18   Wt 138 lb (62.6 kg)   SpO2 94%     General appearance:  Appears comfortable. Eyes:  Sclera clear, pupils equal  ENT: Dry  mucus membranes, Trachea midline. Cardiovascular: Regular rhythm, normal S1, S2. No edema in lower extremities   Respiratory:  Noted Clear to auscultation bilaterally,  No wheeze, good inspiratory effort   Gastrointestinal:  Abdomen soft,  Epigastric -tender,  not distended, normal bowel sounds   Musculoskeletal: No cyanosis in digits, neck supple  Neurology:  Cranial nerves grossly intact. Alert and oriented in time, place and person. No speech or motor deficits   Psychiatry:  Appropriate affect. Not agitated  Skin:  Warm, dry, normal turgor, no rash       Labs:     Recent Labs     07/26/22 2023   WBC 8.4   HGB 15.1   HCT 42.4   *     Recent Labs     07/26/22 2023   *   K 4.8   CL 97*   CO2 24   BUN 9   CREATININE 0.7*   CALCIUM 9.4     Recent Labs     07/26/22 2023   AST 64*   ALT 71*   BILITOT 0.8   ALKPHOS 117     No results for input(s): INR in the last 72 hours.   Recent Labs     07/26/22 2023   TROPONINI <0.01         Urinalysis:      Lab Results   Component Value Date/Time    NITRU Negative 07/26/2022 08:20 PM    [address] 07/26/2022 08:20 PM    BACTERIA None Seen 07/26/2022 08:20 PM    RBCUA 3 07/26/2022 08:20 PM    BLOODU Negative 07/26/2022 08:20 PM    SPECGRAV 1.020 07/26/2022 08:20 PM    GLUCOSEU Negative 07/26/2022 08:20 PM         Radiology:       IMAGING :     CT ABDOMEN PELVIS W IV CONTRAST Additional Contrast? None   Final Result   1. Findings compatible with mild acute pancreatitis involving the head. No   identifiable gallstones or evidence for biliary dilatation. 2.  Marked hepatic steatosis. 3.  Left nephrolithiasis. XR CHEST (2 VW)   Final Result   No acute process. PROBLEM LIST [ ACTIVE + CHRONIC ]     Active Hospital Problems    Diagnosis Date Noted    Acute pancreatitis [K85.90] 07/27/2022     Priority: Medium    Idiopathic acute pancreatitis [K85.00] 07/27/2022     Priority: Medium         ACUTE/CHRONIC ISSUES     Acute idiopathic pancreatitis POA   Lipase elevation POA 2/2 above   Mild hyponatremia POA 2/2 Hypovolemia and poor PO Intake and N/V @ Home   Left nephrolithiasis POA   Fatty liver per imaging     Diet controlled DM Hx       PERTINENT PLAN OF CARE      Prn control. Supportive care. Aggressive IVF  NPO for now . Advance diet per GI recommendations   GI c/s in AM Planned   Insulin SQ for DM Mgt in house     Medication received in ED and workup in ED so far Noted and reviewed as above. Home medications for chronic medical problems Reviewed and Held/Resumed/Changes made, as clinically warranted/Indicated. Please See EPIC Order for detailed plans of care and further Details. DVT Prophylaxis . Is on Lovenox SQ   ; + SCDs   Nutrition/Diet. No diet orders on file  Code Status/Advanced Care Plan . No Order  PT/OT and ambulatory Eval Status. Ambulate As tolerated   Probable LOS & future Disposition planned post discharge .  Home in 2-3 days       CONSULTS ORDERED @ ADMISSION  IP CONSULT TO GI      Medical Decision Making : HIGH     Total patient Care [ Direct and Indirect ] time spent in evaluating the patient an discussing plan with appropriate staff/patient/family members is approximately ~ 48 min       Jo Ann Oseguera MD    Hospitalist, [facility].    7/27/2022 1:01 AM

## 2022-07-27 NOTE — ED PROVIDER NOTES
EKG is reviewed by myself. Dated today 2021. Rate 80 sinus rhythm normal EKG.      Marisela Castorena MD  07/26/22 2016

## 2022-07-27 NOTE — CARE COORDINATION
Discharge Planning Note:    Chart reviewed and it appears that patient has minimal needs for discharge at this time. Discussed with patients nurse and requested that case management be notified if discharge needs are identified.     - Current discharge plan is for the patient to return home with no needs. Case management will continue to follow progress and update discharge plan as needed.       Risk of Readmission Score: 4%    RADHA Sorensen RN    [facility]  Phone: [phone]

## 2022-07-28 LAB
A/G RATIO: 1.4 (ref 1.1–2.2)
ALBUMIN SERPL-MCNC: 3.6 G/DL (ref 3.4–5)
ALP BLD-CCNC: 96 U/L (ref 40–129)
ALT SERPL-CCNC: 46 U/L (ref 10–40)
ANION GAP SERPL CALCULATED.3IONS-SCNC: 8 MMOL/L (ref 3–16)
AST SERPL-CCNC: 56 U/L (ref 15–37)
BASOPHILS ABSOLUTE: 0 K/UL (ref 0–0.2)
BASOPHILS RELATIVE PERCENT: 0.4 %
BILIRUB SERPL-MCNC: 0.7 MG/DL (ref 0–1)
BUN BLDV-MCNC: 6 MG/DL (ref 7–20)
CALCIUM SERPL-MCNC: 9 MG/DL (ref 8.3–10.6)
CHLORIDE BLD-SCNC: 100 MMOL/L (ref 99–110)
CO2: 30 MMOL/L (ref 21–32)
CREAT SERPL-MCNC: <0.5 MG/DL (ref 0.9–1.3)
EKG ATRIAL RATE: 80 BPM
EKG DIAGNOSIS: NORMAL
EKG P AXIS: 44 DEGREES
EKG P-R INTERVAL: 160 MS
EKG Q-T INTERVAL: 370 MS
EKG QRS DURATION: 88 MS
EKG QTC CALCULATION (BAZETT): 426 MS
EKG R AXIS: 8 DEGREES
EKG T AXIS: 46 DEGREES
EKG VENTRICULAR RATE: 80 BPM
EOSINOPHILS ABSOLUTE: 0.1 K/UL (ref 0–0.6)
EOSINOPHILS RELATIVE PERCENT: 1.3 %
GFR AFRICAN AMERICAN: >60
GFR NON-AFRICAN AMERICAN: >60
GLUCOSE BLD-MCNC: 100 MG/DL (ref 70–99)
GLUCOSE BLD-MCNC: 101 MG/DL (ref 70–99)
GLUCOSE BLD-MCNC: 111 MG/DL (ref 70–99)
GLUCOSE BLD-MCNC: 135 MG/DL (ref 70–99)
GLUCOSE BLD-MCNC: 169 MG/DL (ref 70–99)
GLUCOSE BLD-MCNC: 97 MG/DL (ref 70–99)
HCT VFR BLD CALC: 36.2 % (ref 40.5–52.5)
HEMOGLOBIN: 12.3 G/DL (ref 13.5–17.5)
LIPASE: 88 U/L (ref 13–60)
LYMPHOCYTES ABSOLUTE: 1.4 K/UL (ref 1–5.1)
LYMPHOCYTES RELATIVE PERCENT: 23.1 %
MAGNESIUM: 1.9 MG/DL (ref 1.8–2.4)
MCH RBC QN AUTO: 31.7 PG (ref 26–34)
MCHC RBC AUTO-ENTMCNC: 34 G/DL (ref 31–36)
MCV RBC AUTO: 93.2 FL (ref 80–100)
MONOCYTES ABSOLUTE: 0.3 K/UL (ref 0–1.3)
MONOCYTES RELATIVE PERCENT: 5.8 %
NEUTROPHILS ABSOLUTE: 4.1 K/UL (ref 1.7–7.7)
NEUTROPHILS RELATIVE PERCENT: 69.4 %
PDW BLD-RTO: 12.9 % (ref 12.4–15.4)
PERFORMED ON: ABNORMAL
PERFORMED ON: NORMAL
PLATELET # BLD: 78 K/UL (ref 135–450)
PLATELET SLIDE REVIEW: ABNORMAL
PMV BLD AUTO: 10.4 FL (ref 5–10.5)
POTASSIUM REFLEX MAGNESIUM: 3.5 MMOL/L (ref 3.5–5.1)
RBC # BLD: 3.88 M/UL (ref 4.2–5.9)
SLIDE REVIEW: ABNORMAL
SODIUM BLD-SCNC: 138 MMOL/L (ref 136–145)
TOTAL PROTEIN: 6.1 G/DL (ref 6.4–8.2)
WBC # BLD: 5.9 K/UL (ref 4–11)

## 2022-07-28 PROCEDURE — 93010 ELECTROCARDIOGRAM REPORT: CPT | Performed by: INTERNAL MEDICINE

## 2022-07-28 PROCEDURE — 85025 COMPLETE CBC W/AUTO DIFF WBC: CPT

## 2022-07-28 PROCEDURE — 83690 ASSAY OF LIPASE: CPT

## 2022-07-28 PROCEDURE — 83735 ASSAY OF MAGNESIUM: CPT

## 2022-07-28 PROCEDURE — 6360000002 HC RX W HCPCS: Performed by: PHYSICIAN ASSISTANT

## 2022-07-28 PROCEDURE — 6360000002 HC RX W HCPCS: Performed by: HOSPITALIST

## 2022-07-28 PROCEDURE — 94760 N-INVAS EAR/PLS OXIMETRY 1: CPT

## 2022-07-28 PROCEDURE — 36415 COLL VENOUS BLD VENIPUNCTURE: CPT

## 2022-07-28 PROCEDURE — 80053 COMPREHEN METABOLIC PANEL: CPT

## 2022-07-28 PROCEDURE — 1200000000 HC SEMI PRIVATE

## 2022-07-28 PROCEDURE — 2580000003 HC RX 258: Performed by: PHYSICIAN ASSISTANT

## 2022-07-28 RX ORDER — FENOFIBRATE 145 MG/1
145 TABLET, COATED ORAL DAILY
Status: DISCONTINUED | OUTPATIENT
Start: 2022-07-28 | End: 2022-07-28 | Stop reason: RX

## 2022-07-28 RX ADMIN — SODIUM CHLORIDE, POTASSIUM CHLORIDE, SODIUM LACTATE AND CALCIUM CHLORIDE: 600; 310; 30; 20 INJECTION, SOLUTION INTRAVENOUS at 16:08

## 2022-07-28 RX ADMIN — ENOXAPARIN SODIUM 40 MG: 100 INJECTION SUBCUTANEOUS at 22:25

## 2022-07-28 RX ADMIN — SODIUM CHLORIDE, POTASSIUM CHLORIDE, SODIUM LACTATE AND CALCIUM CHLORIDE: 600; 310; 30; 20 INJECTION, SOLUTION INTRAVENOUS at 09:33

## 2022-07-28 RX ADMIN — SODIUM CHLORIDE, POTASSIUM CHLORIDE, SODIUM LACTATE AND CALCIUM CHLORIDE: 600; 310; 30; 20 INJECTION, SOLUTION INTRAVENOUS at 05:40

## 2022-07-28 RX ADMIN — KETOROLAC TROMETHAMINE 30 MG: 30 INJECTION, SOLUTION INTRAMUSCULAR at 22:25

## 2022-07-28 ASSESSMENT — PAIN DESCRIPTION - ORIENTATION: ORIENTATION: MID;RIGHT

## 2022-07-28 ASSESSMENT — PAIN DESCRIPTION - FREQUENCY: FREQUENCY: INTERMITTENT

## 2022-07-28 ASSESSMENT — PAIN DESCRIPTION - ONSET: ONSET: ON-GOING

## 2022-07-28 ASSESSMENT — PAIN DESCRIPTION - DESCRIPTORS: DESCRIPTORS: ACHING

## 2022-07-28 ASSESSMENT — PAIN DESCRIPTION - LOCATION: LOCATION: ABDOMEN

## 2022-07-28 ASSESSMENT — PAIN SCALES - GENERAL: PAINLEVEL_OUTOF10: 7

## 2022-07-28 ASSESSMENT — PAIN DESCRIPTION - PAIN TYPE: TYPE: ACUTE PAIN

## 2022-07-28 NOTE — PROGRESS NOTES
[facility] PROGRESS NOTE    7/28/2022 5:09 PM        Name: Licha Cee . Admitted: 7/26/2022  Primary Care Provider: No primary care provider on file. (Tel: None)      Subjective:  . Patient feeling better. Pain 6/10. No nausea. Headache improved. Reviewed interval ancillary notes    Current Medications  sodium chloride flush 0.9 % injection 5-40 mL, 2 times per day  sodium chloride flush 0.9 % injection 5-40 mL, PRN  0.9 % sodium chloride infusion, PRN  ondansetron (ZOFRAN-ODT) disintegrating tablet 4 mg, Q8H PRN   Or  ondansetron (ZOFRAN) injection 4 mg, Q6H PRN  enoxaparin (LOVENOX) injection 40 mg, Nightly  potassium chloride (KLOR-CON M) extended release tablet 40 mEq, PRN   Or  potassium bicarb-citric acid (EFFER-K) effervescent tablet 40 mEq, PRN   Or  potassium chloride 10 mEq/100 mL IVPB (Peripheral Line), PRN  magnesium sulfate 2000 mg in 50 mL IVPB premix, PRN  morphine (PF) injection 2 mg, Q2H PRN   Or  morphine injection 4 mg, Q2H PRN  dextrose bolus 10% 125 mL, PRN   Or  dextrose bolus 10% 250 mL, PRN  glucagon (rDNA) injection 1 mg, PRN  dextrose 10 % infusion, Continuous PRN  insulin lispro (HUMALOG) injection vial 0-4 Units, Q4H  lactated ringers infusion, Continuous  acetaminophen (TYLENOL) tablet 650 mg, Q6H PRN  ketorolac (TORADOL) injection 30 mg, Q6H PRN        Objective:  /80   Pulse 60   Temp 97.5 °F (36.4 °C) (Oral)   Resp 17   Ht 5' 7\" (1.702 m)   Wt 141 lb 6.4 oz (64.1 kg)   SpO2 94%   BMI 22.15 kg/m²     Intake/Output Summary (Last 24 hours) at 7/28/2022 1709  Last data filed at 7/28/2022 0931  Gross per 24 hour   Intake 6685.38 ml   Output 1300 ml   Net 5385.38 ml      Wt Readings from Last 3 Encounters:   07/27/22 141 lb 6.4 oz (64.1 kg)   08/04/21 140 lb (63.5 kg)       General appearance:  Appears comfortable  Eyes: Sclera clear.  Pupils equal.  ENT: Moist oral mucosa. Trachea midline, no adenopathy. Cardiovascular: Regular rhythm, normal S1, S2. No murmur. No edema in lower extremities  Respiratory: Not using accessory muscles. Good inspiratory effort. Clear to auscultation bilaterally, no wheeze or crackles. GI: Abdomen soft, no tenderness, not distended, normal bowel sounds  Musculoskeletal: No cyanosis in digits, neck supple  Neurology: CN 2-12 grossly intact. No speech or motor deficits  Psych: Normal affect. Alert and oriented in time, place and person  Skin: Warm, dry, normal turgor    Labs and Tests:  CBC:   Recent Labs     07/26/22 2023 07/28/22  0500   WBC 8.4 5.9   HGB 15.1 12.3*   * 78*     BMP:    Recent Labs     07/26/22 2023 07/28/22  0500   * 138   K 4.8 3.5   CL 97* 100   CO2 24 30   BUN 9 6*   CREATININE 0.7* <0.5*   GLUCOSE 180* 101*     Hepatic:   Recent Labs     07/26/22 2023 07/28/22  0500   AST 64* 56*   ALT 71* 46*   BILITOT 0.8 0.7   ALKPHOS 117 96     CT abd/pelvis  1. Findings compatible with mild acute pancreatitis involving the head. No   identifiable gallstones or evidence for biliary dilatation. 2.  Marked hepatic steatosis. 3.  Left nephrolithiasis. RUQ ultrasound  Hepatic steatosis. Otherwise unremarkable right upper quadrant ultrasound. No acute biliary findings. Problem List  Principal Problem:    Idiopathic acute pancreatitis  Active Problems:    Acute pancreatitis  Resolved Problems:    * No resolved hospital problems. *       Assessment & Plan:   Pancreatitis-right upper quadrant ultrasound negative for stones however triglycerides are 2000. Pain slightly improved. We will start clear liquid diet. Hypertriglyceridemia-probable cause for pancreatitis. Fenofibrate has been started. Diet: ADULT DIET;  Clear Liquid  Code:Full Code  DVT PPX: Lovenox      Jenny Fernandez PA-C   7/28/2022 5:09 PM

## 2022-07-28 NOTE — PROGRESS NOTES
Gastroenterology Progress Note    Harpreet Holder is a 56 y.o. male patient. Principal Problem:    Idiopathic acute pancreatitis  Active Problems:    Acute pancreatitis  Resolved Problems:    * No resolved hospital problems. *      SUBJECTIVE:  Pain is much better today. ROS:  No fever, chills  No chest pain, palpitations  No SOB, cough  Gastrointestinal ROS: see above    Physical    VITALS:  /72   Pulse 77   Temp 98.3 °F (36.8 °C) (Oral)   Resp 16   Ht 5' 7\" (1.702 m)   Wt 141 lb 6.4 oz (64.1 kg)   SpO2 92%   BMI 22.15 kg/m²   TEMPERATURE:  Current - Temp: 98.3 °F (36.8 °C); Max - Temp  Av.5 °F (36.9 °C)  Min: 98.2 °F (36.8 °C)  Max: 98.9 °F (37.2 °C)    NAD  Regular rate   Lungs CTA Bilaterally  Abdomen soft, ND, NT,  Bowel sounds normal.    Data    Data Review:    Recent Labs     22  0500   WBC 8.4 5.9   HGB 15.1 12.3*   HCT 42.4 36.2*   MCV 93.1 93.2   * 78*     Recent Labs     22  0500   * 138   K 4.8 3.5   CL 97* 100   CO2 24 30   BUN 9 6*   CREATININE 0.7* <0.5*     Recent Labs     22  0500   AST 64* 56*   ALT 71* 46*   BILITOT 0.8 0.7   ALKPHOS 117 96     Recent Labs     22  0500   LIPASE 158.0* 88.0*     No results for input(s): PROTIME, INR in the last 72 hours. No results for input(s): PTT in the last 72 hours. Latest Reference Range & Units 22 04:30   Triglycerides 0 - 150 mg/dL 2,026 (H)   (H): Data is abnormally high    RUQ US 22  Impression   Hepatic steatosis. Otherwise unremarkable right upper quadrant ultrasound. No acute biliary findings. ASSESSMENT :    Acute pancreatitis - RUQ pain along with mild elevation of transaminses, lipase 158, and CT evidence of mild acute pancreatitis. Right upper quadrant ultrasound neg for stones or biliary dilation. Did have fatty liver. Alcohol use: a few drinks every 1-2 weeks.   does smoke tobacco Triglyceride level 2,000 so pancreatitis due to hypertriglyceridemia. No new meds. Calcium normal.    Pain improved.      Hypertriglyceridemia    Fatty liver       PLAN :  - decrease IVF to 175 ml/hr  - clear liquids   - I&O  - monitor TG  - start fenofibrate  - smoking and alcohol cessation    Discussed with Dr. Jeremy Mccall, [address] [place] [address]

## 2022-07-28 NOTE — PLAN OF CARE
Problem: Discharge Planning  Goal: Discharge to home or other facility with appropriate resources  7/27/2022 2220 by Senait Allen RN  Outcome: Progressing  Flowsheets (Taken 7/27/2022 2115)  Discharge to home or other facility with appropriate resources:   Identify barriers to discharge with patient and caregiver   Arrange for interpreters to assist at discharge as needed   Refer to discharge planning if patient needs post-hospital services based on physician order or complex needs related to functional status, cognitive ability or social support system  7/27/2022 1143 by Darylene Riddles, RN  Outcome: Progressing  Flowsheets (Taken 7/27/2022 0531 by Senait Allen RN)  Discharge to home or other facility with appropriate resources:   Identify barriers to discharge with patient and caregiver   Refer to discharge planning if patient needs post-hospital services based on physician order or complex needs related to functional status, cognitive ability or social support system     Problem: Pain  Goal: Verbalizes/displays adequate comfort level or baseline comfort level  7/27/2022 2220 by Senait Allen RN  Outcome: Progressing  7/27/2022 1143 by Darylene Riddles, RN  Outcome: Progressing     Problem: Gastrointestinal - Adult  Goal: Minimal or absence of nausea and vomiting  7/27/2022 2220 by Senait Allen RN  Outcome: Progressing  Flowsheets (Taken 7/27/2022 2115)  Minimal or absence of nausea and vomiting:   Administer IV fluids as ordered to ensure adequate hydration   Maintain NPO status until nausea and vomiting are resolved   Administer ordered antiemetic medications as needed   Provide nonpharmacologic comfort measures as appropriate  7/27/2022 1143 by Darylene Riddles, RN  Outcome: Progressing  Goal: Maintains or returns to baseline bowel function  7/27/2022 2220 by Senait Allen RN  Outcome: Progressing  Flowsheets (Taken 7/27/2022 2115)  Maintains or returns to baseline bowel function:   Assess bowel function

## 2022-07-29 VITALS
WEIGHT: 141.4 LBS | HEART RATE: 62 BPM | TEMPERATURE: 98 F | RESPIRATION RATE: 20 BRPM | SYSTOLIC BLOOD PRESSURE: 136 MMHG | OXYGEN SATURATION: 93 % | DIASTOLIC BLOOD PRESSURE: 77 MMHG | BODY MASS INDEX: 22.19 KG/M2 | HEIGHT: 67 IN

## 2022-07-29 LAB
A/G RATIO: 1.3 (ref 1.1–2.2)
ALBUMIN SERPL-MCNC: 3.8 G/DL (ref 3.4–5)
ALP BLD-CCNC: 114 U/L (ref 40–129)
ALT SERPL-CCNC: 48 U/L (ref 10–40)
ANION GAP SERPL CALCULATED.3IONS-SCNC: 5 MMOL/L (ref 3–16)
AST SERPL-CCNC: 68 U/L (ref 15–37)
BILIRUB SERPL-MCNC: 0.7 MG/DL (ref 0–1)
BUN BLDV-MCNC: 5 MG/DL (ref 7–20)
CALCIUM SERPL-MCNC: 9.7 MG/DL (ref 8.3–10.6)
CHLORIDE BLD-SCNC: 99 MMOL/L (ref 99–110)
CO2: 36 MMOL/L (ref 21–32)
CREAT SERPL-MCNC: 0.7 MG/DL (ref 0.9–1.3)
GFR AFRICAN AMERICAN: >60
GFR NON-AFRICAN AMERICAN: >60
GLUCOSE BLD-MCNC: 131 MG/DL (ref 70–99)
GLUCOSE BLD-MCNC: 132 MG/DL (ref 70–99)
GLUCOSE BLD-MCNC: 151 MG/DL (ref 70–99)
GLUCOSE BLD-MCNC: 163 MG/DL (ref 70–99)
GLUCOSE BLD-MCNC: 173 MG/DL (ref 70–99)
GLUCOSE BLD-MCNC: 178 MG/DL (ref 70–99)
LIPASE: 77 U/L (ref 13–60)
PERFORMED ON: ABNORMAL
POTASSIUM REFLEX MAGNESIUM: 3.6 MMOL/L (ref 3.5–5.1)
SODIUM BLD-SCNC: 140 MMOL/L (ref 136–145)
TOTAL PROTEIN: 6.8 G/DL (ref 6.4–8.2)
TRIGL SERPL-MCNC: 575 MG/DL (ref 0–150)

## 2022-07-29 PROCEDURE — 83690 ASSAY OF LIPASE: CPT

## 2022-07-29 PROCEDURE — 84478 ASSAY OF TRIGLYCERIDES: CPT

## 2022-07-29 PROCEDURE — 80053 COMPREHEN METABOLIC PANEL: CPT

## 2022-07-29 PROCEDURE — 2580000003 HC RX 258: Performed by: PHYSICIAN ASSISTANT

## 2022-07-29 PROCEDURE — 36415 COLL VENOUS BLD VENIPUNCTURE: CPT

## 2022-07-29 RX ORDER — FENOFIBRATE 145 MG/1
145 TABLET, COATED ORAL DAILY
Qty: 30 TABLET | Refills: 1 | Status: SHIPPED | OUTPATIENT
Start: 2022-07-29

## 2022-07-29 RX ADMIN — SODIUM CHLORIDE, POTASSIUM CHLORIDE, SODIUM LACTATE AND CALCIUM CHLORIDE: 600; 310; 30; 20 INJECTION, SOLUTION INTRAVENOUS at 08:40

## 2022-07-29 RX ADMIN — SODIUM CHLORIDE, POTASSIUM CHLORIDE, SODIUM LACTATE AND CALCIUM CHLORIDE: 600; 310; 30; 20 INJECTION, SOLUTION INTRAVENOUS at 02:59

## 2022-07-29 ASSESSMENT — PAIN DESCRIPTION - LOCATION: LOCATION: ABDOMEN

## 2022-07-29 ASSESSMENT — PAIN DESCRIPTION - ONSET: ONSET: ON-GOING

## 2022-07-29 ASSESSMENT — PAIN DESCRIPTION - PAIN TYPE: TYPE: ACUTE PAIN

## 2022-07-29 ASSESSMENT — PAIN DESCRIPTION - ORIENTATION: ORIENTATION: LOWER;RIGHT;LEFT

## 2022-07-29 ASSESSMENT — PAIN SCALES - GENERAL
PAINLEVEL_OUTOF10: 5
PAINLEVEL_OUTOF10: 0

## 2022-07-29 ASSESSMENT — PAIN DESCRIPTION - DESCRIPTORS: DESCRIPTORS: CRAMPING

## 2022-07-29 ASSESSMENT — PAIN DESCRIPTION - FREQUENCY: FREQUENCY: INTERMITTENT

## 2022-07-29 ASSESSMENT — PAIN - FUNCTIONAL ASSESSMENT: PAIN_FUNCTIONAL_ASSESSMENT: ACTIVITIES ARE NOT PREVENTED

## 2022-07-29 NOTE — PROGRESS NOTES
Gastroenterology Progress Note    Harpreet Brower is a 56 y.o. male patient. Principal Problem:    Idiopathic acute pancreatitis  Active Problems:    Acute pancreatitis  Resolved Problems:    * No resolved hospital problems. *      SUBJECTIVE:  Really not much abdominal pain. Tolerating liquid diet and rice so far. ROS:  No fever, chills  No chest pain, palpitations  No SOB, cough  Gastrointestinal ROS: see above    Physical    VITALS:  /77   Pulse 71   Temp 97.9 °F (36.6 °C) (Oral)   Resp 17   Ht 5' 7\" (1.702 m)   Wt 141 lb 6.4 oz (64.1 kg)   SpO2 93%   BMI 22.15 kg/m²   TEMPERATURE:  Current - Temp: 97.9 °F (36.6 °C); Max - Temp  Av.3 °F (36.8 °C)  Min: 97.5 °F (36.4 °C)  Max: 99.2 °F (37.3 °C)    NAD  Regular rate   Lungs CTA Bilaterally  Abdomen soft, ND, NT,  Bowel sounds normal.    Data    Data Review:    Recent Labs     22  0500   WBC 8.4 5.9   HGB 15.1 12.3*   HCT 42.4 36.2*   MCV 93.1 93.2   * 78*       Recent Labs     22  0500 22  0512   * 138 140   K 4.8 3.5 3.6   CL 97* 100 99   CO2 24 30 36*   BUN 9 6* 5*   CREATININE 0.7* <0.5* 0.7*       Recent Labs     22  0500 22  0512   AST 64* 56* 68*   ALT 71* 46* 48*   BILITOT 0.8 0.7 0.7   ALKPHOS 117 96 114       Recent Labs     22  0500 22  0512   LIPASE 158.0* 88.0* 77.0*       No results for input(s): PROTIME, INR in the last 72 hours. No results for input(s): PTT in the last 72 hours. Latest Reference Range & Units 22 04:30   Triglycerides 0 - 150 mg/dL 2,026 (H)   (H): Data is abnormally high    RUQ US 7/27/22  Impression   Hepatic steatosis. Otherwise unremarkable right upper quadrant ultrasound. No acute biliary findings. ASSESSMENT :    Acute pancreatitis - RUQ pain along with mild elevation of transaminses, lipase 158, and CT evidence of mild acute pancreatitis.   Right upper quadrant ultrasound neg for stones or biliary dilation. Did have fatty liver. Alcohol use: a few drinks every 1-2 weeks. does smoke tobacco. No new meds. Calcium normal.  Triglyceride level 2,000 so pancreatitis due to hypertriglyceridemia. repeat TG level 500. Pain resolving. Hypertriglyceridemia    Fatty liver       PLAN :  - d/c IVF    - low fat diet  - fenofibrate  - smoking and alcohol cessation  - ok for d/c home from GI standpoint     Will sign off. Please call if questions.    Discussed with Dr. Cayetano Gabriel, [facility]

## 2022-07-29 NOTE — PROGRESS NOTES
Shift assessment completed, ( Jollyla Dandy #[id number]) see flowsheet Medications given per MAR. Patient is Alert and oriented x4. In bed watching television. Pt stated he had bm earlier in day and voiding adequately. Educated patient on need for strict I/o per order. Pat verbalized understanding. Denies any need for pain medication at this time. Pt expressed no other needs at this time. Will continue with orders/nursing care. Fall precautions in place, hourly rounding, call light and belongings in reach, bed in lowest position, wheels locked in place, side rails up x 2, walkways free of clutter  The care plan and education has been reviewed and mutually agreed upon with the patient.

## 2022-07-29 NOTE — PROGRESS NOTES
Pt reports no pain or nausea after eating veggies and rice. Will review discharge instructions, remove IV. Daughter is here to take patient home.

## 2022-07-29 NOTE — PLAN OF CARE
Problem: Discharge Planning  Goal: Discharge to home or other facility with appropriate resources  Outcome: Progressing  Flowsheets (Taken 7/28/2022 0935 by Roxana Collazo, RN)  Discharge to home or other facility with appropriate resources:   Arrange for needed discharge resources and transportation as appropriate   Identify discharge learning needs (meds, wound care, etc)   Arrange for interpreters to assist at discharge as needed     Problem: Pain  Goal: Verbalizes/displays adequate comfort level or baseline comfort level  Outcome: Progressing     Problem: Gastrointestinal - Adult  Goal: Minimal or absence of nausea and vomiting  Outcome: Progressing  Flowsheets (Taken 7/28/2022 0935 by Roxana Collazo, RN)  Minimal or absence of nausea and vomiting:   Administer IV fluids as ordered to ensure adequate hydration   Maintain NPO status until nausea and vomiting are resolved   Administer ordered antiemetic medications as needed   Provide nonpharmacologic comfort measures as appropriate   Advance diet as tolerated, if ordered  Goal: Maintains or returns to baseline bowel function  Outcome: Progressing  Flowsheets (Taken 7/28/2022 0935 by Roxana Collazo, RN)  Maintains or returns to baseline bowel function:   Administer IV fluids as ordered to ensure adequate hydration   Administer ordered medications as needed   Encourage mobilization and activity   Assess bowel function  Goal: Maintains adequate nutritional intake  Outcome: Progressing  Flowsheets (Taken 7/28/2022 0935 by Roxana Collazo, RN)  Maintains adequate nutritional intake:   Monitor percentage of each meal consumed   Identify factors contributing to decreased intake, treat as appropriate   Monitor intake and output, weight and lab values     Problem: Gastrointestinal - Adult  Goal: Maintains or returns to baseline bowel function  Outcome: Progressing  Flowsheets (Taken 7/28/2022 0935 by Roxana Collazo, RN)  Maintains or returns to baseline bowel function: Administer IV fluids as ordered to ensure adequate hydration   Administer ordered medications as needed   Encourage mobilization and activity   Assess bowel function     Problem: Gastrointestinal - Adult  Goal: Maintains adequate nutritional intake  Outcome: Progressing  Flowsheets (Taken 7/28/2022 0935 by Marlene Ortiz RN)  Maintains adequate nutritional intake:   Monitor percentage of each meal consumed   Identify factors contributing to decreased intake, treat as appropriate   Monitor intake and output, weight and lab values     Problem: Metabolic/Fluid and Electrolytes - Adult  Goal: Electrolytes maintained within normal limits  Outcome: Progressing  Flowsheets (Taken 7/28/2022 0935 by Marlene Ortiz RN)  Electrolytes maintained within normal limits:   Monitor labs and assess patient for signs and symptoms of electrolyte imbalances   Administer electrolyte replacement as ordered   Monitor response to electrolyte replacements, including repeat lab results as appropriate   Instruct patient on fluid and nutrition restrictions as appropriate     Problem: Safety - Adult  Goal: Free from fall injury  Outcome: Progressing  Flowsheets (Taken 7/28/2022 2326)  Free From Fall Injury: Instruct family/caregiver on patient safety    Fall precautions in place, hourly rounding, call light and belongings in reach, bed in lowest position, wheels locked in place, side rails up x 2, walkways free of clutter    Skin remains dry and intact, no new signs of breakdown noted. Patient encouraged to reposition every 2 hours and is assisted to do so when unable to reposition independently. Pain assessed using 0-10 scale, patient able to voice pain level and states pain improved with prn medication administered. Calista Cotto

## 2022-07-30 NOTE — DISCHARGE SUMMARY
[facility]    Patient Demographics    Patient. Harpreet Fish  Date of Birth. 1965  MRN. [id number]     Primary care provider. No primary care provider on file. (Tel: None)    Admit date: 7/26/2022    Discharge date (blank if same as Note Date): 7/29/2022  Note Date: 7/30/2022     Reason for Hospitalization. Chief Complaint   Patient presents with    Chest Pain     Chest pain, rib pain, n/v         Significant Findings. Principal Problem:    Idiopathic acute pancreatitis  Active Problems:    Acute pancreatitis  Resolved Problems:    * No resolved hospital problems. *       Problems and results from this hospitalization that need follow up. Pancreatitis  hypertriglyceridemi    Significant test results and incidental findings. CT abd/pelvis  1. Findings compatible with mild acute pancreatitis involving the head. No   identifiable gallstones or evidence for biliary dilatation. 2.  Marked hepatic steatosis. 3.  Left nephrolithiasis. RUQ ultrasound  Hepatic steatosis. Otherwise unremarkable right upper quadrant ultrasound. No acute biliary findings. Invasive procedures and treatments. None     [facility]. Patient is a 60-year-old [place] male who presented to the hospital with epigastric and right upper quadrant abdominal pain. This was associated with vomiting. In the emergency room his lipase was elevated. CT was suggestive of acute. Patient was admitted hydrated and seen by GI. He had a right upper quadrant ultrasound which was negative for cholelithiasis or cholecystitis. His triglycerides were elevated at 2000. He was started on fenofibrate as well as a clear liquid diet. His pain improved, his diet was advanced and he was discharged home in stable condition.   He did have his fenofibrate filled at an outside pharmacy. Consults. IP CONSULT TO GI  IP CONSULT TO DIETITIAN    Physical examination on discharge day. /77   Pulse 62   Temp 98 °F (36.7 °C) (Oral)   Resp 20   Ht 5' 7\" (1.702 m)   Wt 141 lb 6.4 oz (64.1 kg)   SpO2 93%   BMI 22.15 kg/m²   General appearance. Alert. Looks comfortable. HEENT. Sclera clear. Moist mucus membranes. Cardiovascular. Regular rate and rhythm, normal S1, S2. No murmur. Respiratory. Not using accessory muscles. Clear to auscultation bilaterally, no wheeze. Gastrointestinal. Abdomen soft, non-tender, not distended, normal bowel sounds  Neurology. Facial symmetry. No speech deficits. Moving all extremities equally. Extremities. No edema in lower extremities. Skin. Warm, dry, normal turgor    Condition at time of discharge stable    Medication instructions provided to patient at discharge. Medication List        START taking these medications      fenofibrate 145 MG tablet  Commonly known as: Tricor  Take 1 tablet by mouth in the morning. STOP taking these medications      ibuprofen 600 MG tablet  Commonly known as: ADVIL;MOTRIN               Where to Get Your Medications        These medications were sent to [facility], [address] [place] [address] 37, [address]      Phone: [phone]   fenofibrate 145 MG tablet         Discharge recommendations given to patient. Follow Up. PCP in 1 week   Disposition. home  Activity. activity as tolerated  Diet: low fat diet    Spent 33 minutes in discharge process. Signed:   Kris Fountain PA-C     7/30/2022 12:09 PM

## 2024-04-30 ENCOUNTER — OFFICE VISIT (OUTPATIENT)
Dept: ORTHOPEDIC SURGERY | Age: 59
End: 2024-04-30

## 2024-04-30 VITALS — WEIGHT: 141 LBS | BODY MASS INDEX: 22.13 KG/M2 | RESPIRATION RATE: 16 BRPM | HEIGHT: 67 IN

## 2024-04-30 DIAGNOSIS — R20.0 LEFT UPPER EXTREMITY NUMBNESS: ICD-10-CM

## 2024-04-30 DIAGNOSIS — M54.12 CERVICAL RADICULOPATHY: Primary | ICD-10-CM

## 2024-04-30 PROCEDURE — 99203 OFFICE O/P NEW LOW 30 MIN: CPT | Performed by: ORTHOPAEDIC SURGERY

## 2024-04-30 RX ORDER — OMEPRAZOLE 20 MG/1
20 CAPSULE, DELAYED RELEASE ORAL DAILY
COMMUNITY
Start: 2024-02-05

## 2024-04-30 RX ORDER — BENZONATATE 100 MG/1
CAPSULE ORAL
COMMUNITY
Start: 2024-04-22

## 2024-04-30 RX ORDER — GLIPIZIDE 5 MG/1
5 TABLET, FILM COATED, EXTENDED RELEASE ORAL
COMMUNITY
Start: 2024-02-05

## 2024-04-30 RX ORDER — LORATADINE 10 MG/1
10 TABLET ORAL DAILY
COMMUNITY
Start: 2024-02-05

## 2024-04-30 RX ORDER — ERGOCALCIFEROL 1.25 MG/1
50000 CAPSULE ORAL WEEKLY
COMMUNITY
Start: 2024-02-05

## 2024-04-30 RX ORDER — MELOXICAM 15 MG/1
15 TABLET ORAL DAILY
COMMUNITY
Start: 2024-04-22

## 2024-04-30 RX ORDER — ROSUVASTATIN CALCIUM 40 MG/1
40 TABLET, COATED ORAL DAILY
COMMUNITY
Start: 2024-02-05

## 2024-04-30 RX ORDER — MULTIVITAMIN WITH IRON
250 TABLET ORAL 2 TIMES DAILY
COMMUNITY
Start: 2024-02-05

## 2024-04-30 RX ORDER — PIOGLITAZONEHYDROCHLORIDE 45 MG/1
45 TABLET ORAL DAILY
COMMUNITY
Start: 2024-02-05

## 2024-04-30 RX ORDER — KETOCONAZOLE 20 MG/ML
SHAMPOO TOPICAL
COMMUNITY
Start: 2023-11-06

## 2024-04-30 RX ORDER — SERTRALINE HYDROCHLORIDE 100 MG/1
100 TABLET, FILM COATED ORAL DAILY
COMMUNITY
Start: 2024-02-05

## 2024-04-30 NOTE — PROGRESS NOTES
CHIEF COMPLAINT: Left arm pain.    History:   Harpreet Fish is a 59 y.o. right handed male self-referred for evaluation and treatment of left arm pain.  He is seen with [facility] .  This is evaluated as a personal injury. Pain began 15 months ago. Pain is rated as a 9-10/10.   There was a history of injury.  He states that he cut his hand with a saw and this caused him to start having left arm pain.  He was scheduled for left elbow pain.  However, he points to pain being located mostly along his mid humerus and going down to his elbow.  Symptoms are worse with any movement.  He describes pain when he extends his wrist or thumb.  He does note numbness and tingling in his index finger.  He does complain of neck pain.  He does complain of radicular pain.  The patient has had PT at [facility].they have noted decreased strength throughout his entire left upper extremity.   The patient has not had an injection.   The patient has tried ice, without relief relief.  He has tried heat on his neck with relief.  The patient has tried NSAIDs, meloxicam without relief.  Patient's occupation is unemployed.  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        Past Medical History:   Diagnosis Date    CAD (coronary artery disease)     Diabetes mellitus (HCC)        Past Surgical History:   Procedure Laterality Date    KIDNEY STONE REMOVAL         No family history on file.    Social History     Socioeconomic History    Marital status:      Spouse name: None    Number of children: None    Years of education: None    Highest education level: None   Tobacco Use    Smoking status: Never    Smokeless tobacco: Never   Substance and Sexual Activity    Alcohol use: Not Currently    Drug use: Not Currently    Sexual activity: Not Currently       Current Outpatient Medications   Medication Sig Dispense Refill    benzonatate (TESSALON) 100 MG capsule 1-2 tabs PO Tid PRN cough      vitamin D (ERGOCALCIFEROL) 1.25 MG ([id number]

## 2024-05-01 ENCOUNTER — TELEPHONE (OUTPATIENT)
Dept: ORTHOPEDIC SURGERY | Age: 59
End: 2024-05-01

## 2024-05-01 NOTE — TELEPHONE ENCOUNTER
General Question     Subject: [facility] STATES THEY DID NOT GET THE REFERRAL FOR THE EMG. REQUESTING A CALL BACK. CAN LVM. OR CALL SISTER MARSHALL.  Patient and /or Facility Request: [facility]   Contact Number: [phone]

## 2024-05-01 NOTE — TELEPHONE ENCOUNTER
S/W TANA if he is trying to schedule at [facility], the EMG order is in his chart and he may call [phone] to schedule the EMG.   Despite there being no completed HIPAA form on file, a gentleman who identified himself as the patient's son, insisted he called [facility] to schedule and they were unable to proceed with scheduling the EMG and suggested another location. He continued to insist that we speak with him. The  explained when Tana was in the office yesterday, he did not complete  a HIPAA form; therefore, we cannot speak with him regarding the situation. Per the , the son stated he will call back with the information regarding where they will proceed with the EMG.       Referral faxed to:   Steffen Pacheco - Please call [phone] to schedule your EMG.

## 2024-05-16 NOTE — TELEPHONE ENCOUNTER
With assistance of phone interpretation service, LYNETTE Mullins.    I am not able to locate contact information for physician name provided. Asked that they call back with with fax number for EMG order to sent to.

## 2024-05-16 NOTE — TELEPHONE ENCOUNTER
SON CALLED STATES THAT HE WOULD LIKE TO HAVE MRI SENT TO DR NAVA AT [address] DID NOT HAVE NUMBER. PLS CALL TO [facility] [phone]

## 2024-05-17 NOTE — TELEPHONE ENCOUNTER
OTHER    SON WOULD LIKE TO HAVE EMG SENT TO DR NAVA'S OFFICE. HE PROVIDED FAX NUMBER    [phone]    PLEASE [facility]

## 2024-06-10 ENCOUNTER — PROCEDURE VISIT (OUTPATIENT)
Dept: NEUROLOGY | Age: 59
End: 2024-06-10
Payer: COMMERCIAL

## 2024-06-10 DIAGNOSIS — R20.0 LEFT UPPER EXTREMITY NUMBNESS: ICD-10-CM

## 2024-06-10 PROCEDURE — 95909 NRV CNDJ TST 5-6 STUDIES: CPT | Performed by: PSYCHIATRY & NEUROLOGY

## 2024-06-10 PROCEDURE — 95886 MUSC TEST DONE W/N TEST COMP: CPT | Performed by: PSYCHIATRY & NEUROLOGY

## 2024-06-10 NOTE — PROGRESS NOTES
Dear Nik Salguero MD  [address]  [address]  [place] [address]    I had the pleasure of seeing your patient Harpreet Fish  today for EMG and NCV. I have attached a detailed summary below:        Electromyography report        [facility]  [address], [address]  [place] [address]      Patient: Harpreet Fish    MR Number: [id number]  YOB: 1965  Date of Visit: 6/10/2024    Clinical history:  The patient is a 59 y.o. years old male with several month history of left upper extremity paresthesia median distribution.  No weakness or atrophy today.  Negative Tinel sign.  No sensory deficit.    Findings:   For full details about such findings, please see attached report. Needle examination was recorded using monopolar needle in selected muscles.     1.  Left median motor distal latency, amplitude and conduction velocities were within normal limits.  2.  Left ulnar motor distal latency, amplitudes and conduction velocities were within normal limits.  3.  Left median sensory distal latency and amplitude were within normal limits.  4.  Left ulnar sensory distal latency and amplitude were within normal limits.  Patient's temperature was somewhat suboptimal and could account for delayed latency and decreased velocity.  5.  Left radial sensory distal latency and amplitude were within normal limit.  6.  Needle examinations of the left upper extremity was performed in selected muscles. Needle examination of these selected muscle showed normal insertional activities, morphology, amplitude, and recruitment of motor unit potential.      Impression:    This test is within normal limits. The electrophysiological pattern showed no evidence of polyneuropathy, plexopathy, or radiculopathy.      Jay Mcdowell MD    Diplomate of the [employer] board of electrodiagnostics.

## 2024-06-10 NOTE — PATIENT INSTRUCTIONS
Verbal consent was obtained from patient and/or patient's advocate for in office procedure with Dr. Jay Cooley MD (EMG or EEG).